# Patient Record
Sex: MALE | Race: WHITE | NOT HISPANIC OR LATINO
[De-identification: names, ages, dates, MRNs, and addresses within clinical notes are randomized per-mention and may not be internally consistent; named-entity substitution may affect disease eponyms.]

---

## 2022-06-06 ENCOUNTER — APPOINTMENT (OUTPATIENT)
Dept: UROLOGY | Facility: CLINIC | Age: 67
End: 2022-06-06
Payer: SELF-PAY

## 2022-06-06 VITALS
HEIGHT: 72 IN | SYSTOLIC BLOOD PRESSURE: 118 MMHG | BODY MASS INDEX: 21.67 KG/M2 | DIASTOLIC BLOOD PRESSURE: 78 MMHG | WEIGHT: 160 LBS | TEMPERATURE: 97.2 F

## 2022-06-06 DIAGNOSIS — E03.9 HYPOTHYROIDISM, UNSPECIFIED: ICD-10-CM

## 2022-06-06 DIAGNOSIS — Z78.9 OTHER SPECIFIED HEALTH STATUS: ICD-10-CM

## 2022-06-06 PROCEDURE — 99205 OFFICE O/P NEW HI 60 MIN: CPT

## 2022-06-06 RX ORDER — TESTOSTERONE 30 MG/1.5ML
SOLUTION TOPICAL
Refills: 0 | Status: ACTIVE | COMMUNITY

## 2022-06-06 RX ORDER — MODAFINIL 200 MG/1
200 TABLET ORAL
Refills: 0 | Status: ACTIVE | COMMUNITY

## 2022-06-06 RX ORDER — LEVOTHYROXINE SODIUM ANHYDROUS 100 UG/5ML
INJECTION, POWDER, LYOPHILIZED, FOR SOLUTION INTRAVENOUS
Refills: 0 | Status: ACTIVE | COMMUNITY

## 2022-06-06 NOTE — ASSESSMENT
[FreeTextEntry1] : The patient came in to discuss the different treatment options available for his organic erectile dysfunction. The following note describes the conversation that was performed today during the consultation. \par \par  I reviewed the Patient History Form which the patient filled out, made sure that his ailment was organic erectile dysfunction and I discussed in detail with him all previously tried treatments for his ED. We had a thorough discussion about all of the alternatives available, and I made sure to include in our discussion pills such as Levitra/Viagra/Cialis, as well as penile self injectable therapies, MUSE (Medicated Urethral System for Erection), vacuum device, and penile implant.  I stressed the risks and benefits and pros and cons of each of these options extensively. A power point presentation was also used to illustrate each treatment option. The patient was also provided with a packet of written information for him to take to his home that included the following documents and reading materials: \par \par -     Brief practice description\par \par -     An erectile dysfunction expert speaks up \par \par -     Implant for him\par \par -     Inflatable penile implants cure erectile dysfunction\par \par -     Erectile dysfunction\par \par -     New treatments for erectile dysfunction\par \par -     [employer]\par \par -     Duplex Sonography description\par \par -     Penile prosthesis patient phone list\par \par -     "No-Touch Technique" article\par \par -     Coated implants and "No Touch" surgical technique decreases risk of infection in inflatable penile prosthesis implantation to 0.46% article\par \par -     Internal penile pump brochure\par \par -     Sex does the body good article\par \par -     Erectile dysfunction: The bare basics\par \par -     Consortium for improvement in erectile dysfunction\par \par -     What is new for inflatable penile prostheses?\par \par -     Cover story: Multi-Component inflatable penile prostheses: Keys to success\par \par In discussing penile implant surgery, the patient was made aware of the different types of penile implants- including semirigid devices, 2-piece or Ambicor (AMS) devices, and the inflatable penile prosthesis with 3 components. I went on to mention that there are 2 brands of devices, Coloplast and AMS, and that the AMS is impregnated with antibiotic (inhibizone), and the Coloplast is dipped then coated with an antibiotic. I also referred him to my website in order to obtain additional information about the types of implants available.  He felt he would defer to my judgment as to which device to use.\par \par In the power point presentation, the patient was also able to see pictures of implanted patients, highlights of the "No-Touch" surgical technique and outcome data including number of procedures previously performed and updated rate of infection. In this initial discussion of the penile implant option, I made sure we had a very long and kush discussion about the risks.  I stated that, first and foremost, infection is the most dreaded risk and complication, which range in incidence from 1-3% of all cases performed in the [place], but that in my hands, using the "No-Touch" technique the incidence of infection is less than 1%.  I stated that should infection occur, the entire device would need to be removed, which typically happens in the first several weeks after surgery.  I explained that should this occur, there would likely be corporal fibrosis, scarring, penile shortening and even penile necrosis and disfigurement.  I said that while I would do absolutely everything possible to reduce and mitigate this risk, if it occur, the device would have to be removed, and then a salvage procedure with a semi-rigid implant possibly done, or the device would have to be removed with delayed re-implantation, or simply avoid future surgery completely.  I explained what this salvage procedure is, and that a new implant could be placed in the same setting with a complex irrigation of antibiotics and saline lavage, but that the infection risk at this salvage procedure is even higher, up to 30%. Furthermore the possible need for hospitalization, prolonged intravenous antibiotics and need further additional surgery was also discussed.  The patient was informed that if the salvage operation failed or if the infected implant were to be removed completely that significant shortening of the penis would occur making implantation of another device very difficult with very poor outcome and patient satisfaction. I explained that this is a real and significant risk that has to be weighed and considered.\par \par Next I expounded on the other risks of the operation.  These include injury to the urethra or bladder, and should these occur, the operation would have to be altered or aborted.  I explained that very rarely, vascular injury and bleeding can happen, and if iliac vein injury occurs from reservoir placement, this could be catastrophic and result in major blood loss and theoretically risk of leg loss in severe instances.  \par \par I went on to discuss that after the implant is placed, penile shortening could likely occur, and this is up to 1-2cm total.  Some of this is due to lack of glans engorgement, though MUSE could be used post-operatively to reduce this factor.  Next I also explained the risk of dissatisfaction with the cosmetic or functional result of the device, meaning that he could simply be unhappy with the result.  Some people find that while they have a good full erection, they have changes in sensation, difficulty obtaining an orgasm, and dissatisfaction with sex in general.  I made sure he verbalized and demonstrated a good understanding of these points.\par \par Next, I explained the risk of device breakage or failure, and future operations might be needed should this occur to fix the device tubing breakages with fluid leaks.  There is also a risk of auto-inflation, and even inability to successfully use the device due to technical considerations and inability to use or find the pump.  I did state that I would be available to him to teach him and train him to use his device, and also available to treat any other issue mentioned above such as device breakage or auto-inflation.\par \par Next we discussed the fact that rarely further minor surgery may be needed to make final adjustments to the penile implant. Reasons for this would be to adjust the length of the cylinders, reposition the pump or location of the reservoir. \par \par Prior to scheduling surgery the patient was asked to read the material which was provided to him today, to see a cardiologist to obtain a medical clearance, to visit my website [email or web address]   to obtain additional information, to call patients who were previously implanted and to discuss his options with his partner. Before leaving the office, I made sure he verbalized understanding all the risk and benefits, and pros and cons of surgery.  He had the ability to ask questions, and I also explained to him what to expect from the surgery.  I made sure he had access to literature to read, and offered him the ability to speak to prior patients of mine to get a sense of what to expect, and that these reports would hopefully be as unbiased as possible. If he remains interested in having an implant he understands that he will need to schedule a penile Duplex ultrasound study during which measurements of the penis will be made.\par \par  \par \par \par

## 2022-06-06 NOTE — HISTORY OF PRESENT ILLNESS
[FreeTextEntry1] : Patient is a 66-year-old gentleman with a 10-year history of erectile dysfunction the patient was initially treated with oral medications that are currently no longer effective.  He also did penile injection therapy and the vacuum pump.  He is  for 28 years his relationship is excellent however sexual relationship is currently in existent.  He was diagnosed with low testosterone level and is currently on testosterone injections.

## 2022-06-22 ENCOUNTER — APPOINTMENT (OUTPATIENT)
Dept: UROLOGY | Facility: CLINIC | Age: 67
End: 2022-06-22

## 2022-06-22 VITALS
BODY MASS INDEX: 21.67 KG/M2 | TEMPERATURE: 97.8 F | DIASTOLIC BLOOD PRESSURE: 80 MMHG | HEIGHT: 72 IN | WEIGHT: 160 LBS | SYSTOLIC BLOOD PRESSURE: 118 MMHG

## 2022-06-22 PROCEDURE — 54235 NJX CORPORA CAVERNOSA RX AGT: CPT

## 2022-06-22 PROCEDURE — 93980 PENILE VASCULAR STUDY: CPT

## 2022-06-22 PROCEDURE — 99214 OFFICE O/P EST MOD 30 MIN: CPT | Mod: 25

## 2022-06-22 NOTE — HISTORY OF PRESENT ILLNESS
[FreeTextEntry1] : The patient is here for duplex study.  He also wanted to discuss issues with penile implant surgery.  He is undergoing a prostate artery embolization for treatment of BPH and has also questions regarding the interaction with with that procedure and the penile implant procedure.

## 2022-06-22 NOTE — ASSESSMENT
[FreeTextEntry1] : PENILE DUPLEX SONOGRAPHY WITH PULSED DOPPLER ANALYSIS:\par Procedure description:\par The flaccid penis was scanned with the 18 MHz probe and images obtained longitudinally.  \par The diameters of the corporal arteries were measured:\par \par The right cavernosal artery measured:  mm\par The left cavernosal artery measured:    mm\par \par Following intracavernous injection of alprostadil 20 microgram/ml in 1  ml\par \par The penis was rescanned and the diameter of the cavernosal arteries were measured again to assess distensibility in response to the vasoactive medication. \par (A 100% increase in diameter is normally expected.)\par \par The left cavernosal artery measured:   mm\par The right cavernosal artery measured:   mm\par \par Pulsed Doppler analysis:\par Each of the selective imaged arteries underwent penile Doppler analysis with generation of waveform. \par The peak velocity data of the cavernosal arteries is tabulated below: \par (A velocity of 35 cm/s or more is normally expected.)  \par \par Left cavernosal artery peak systolic velocity at 15 minutes:   centimeters per second\par Let cavernosal end-diastolic velocity at 15 minutes:                centimeters per second\par \par Right cavernosal artery peak systolic velocity at 15 minutes:   centimeters per second \par Right end-diastolic velocity at 15 minutes:                                 centimeters per second\par \par Resistive index parameters were obtained bilaterally (normal is 100%): \par \par Left cavernosal artery resistive index:       % \par Right cavernosal artery resistive index:      % \par \par Spontaneous detumescence occurred after 60  minutes\par The patient was discharged with a soft erection and was advised to call should an erection persist for more than 4 hours.  \par \par Impression:\par Arterial  distensibility:   \par Arterial peak flow velocities:     \par Resistive index:  \par \par Based of the patient's medical history, pertinent physical findings and the above parameters, the patient's diagnosis is    \par \par After the Duplex study was terminated, I sat with the patient for 20 minutes and I explained to him the findings of the study. I also discussed his diagnosis with him as well as the recommended course of action. He understands the reason why he has ED and agrees with the plan of action.\par \par \par

## 2022-07-18 ENCOUNTER — APPOINTMENT (OUTPATIENT)
Dept: UROLOGY | Facility: CLINIC | Age: 67
End: 2022-07-18

## 2022-07-18 VITALS
HEIGHT: 72 IN | BODY MASS INDEX: 20.93 KG/M2 | WEIGHT: 154.5 LBS | DIASTOLIC BLOOD PRESSURE: 72 MMHG | TEMPERATURE: 98.4 F | SYSTOLIC BLOOD PRESSURE: 118 MMHG

## 2022-07-18 DIAGNOSIS — N48.6 INDURATION PENIS PLASTICA: ICD-10-CM

## 2022-07-18 DIAGNOSIS — Z01.818 ENCOUNTER FOR OTHER PREPROCEDURAL EXAMINATION: ICD-10-CM

## 2022-07-18 DIAGNOSIS — N48.89 OTHER SPECIFIED DISORDERS OF PENIS: ICD-10-CM

## 2022-07-18 PROCEDURE — 51741 ELECTRO-UROFLOWMETRY FIRST: CPT

## 2022-07-18 PROCEDURE — 52000 CYSTOURETHROSCOPY: CPT

## 2022-07-18 PROCEDURE — 51798 US URINE CAPACITY MEASURE: CPT | Mod: 59

## 2022-07-18 PROCEDURE — 99215 OFFICE O/P EST HI 40 MIN: CPT | Mod: 25

## 2022-07-18 PROCEDURE — 51725 SIMPLE CYSTOMETROGRAM: CPT

## 2022-07-18 RX ORDER — DOCUSATE SODIUM 100 MG/1
100 CAPSULE ORAL TWICE DAILY
Qty: 56 | Refills: 0 | Status: ACTIVE | COMMUNITY
Start: 2022-07-18 | End: 1900-01-01

## 2022-07-18 RX ORDER — IBUPROFEN 600 MG/1
600 TABLET, FILM COATED ORAL
Qty: 28 | Refills: 0 | Status: ACTIVE | COMMUNITY
Start: 2022-07-18 | End: 1900-01-01

## 2022-07-18 RX ORDER — CEPHALEXIN 250 MG/1
250 CAPSULE ORAL
Qty: 84 | Refills: 0 | Status: ACTIVE | COMMUNITY
Start: 2022-07-18 | End: 1900-01-01

## 2022-07-20 PROBLEM — N48.89 PENILE ATROPHY: Status: ACTIVE | Noted: 2022-06-06

## 2022-07-20 PROBLEM — N48.6 PEYRONIE'S DISEASE: Status: ACTIVE | Noted: 2022-06-06

## 2022-07-20 PROBLEM — Z01.818 PREOP TESTING: Status: ACTIVE | Noted: 2022-07-14

## 2022-07-20 NOTE — HISTORY OF PRESENT ILLNESS
[FreeTextEntry1] : The patient is here for his preoperative visit.  After the cystoscopy and cystometrogram the patient wanted to sit down and discuss the penile implant procedure.

## 2022-07-20 NOTE — ASSESSMENT
[FreeTextEntry1] : In this pre-operative setting, I spent some time (65 minutes) ensuring that the discussions we had in the office during the patient's initial consultation and penile Doppler visit was still clear in his mind, that he understood the risks and benefits and pros and cons of the penile implant procedure, including treatment alternatives, and that he verbalized the risks and wanted to proceed.  I again made sure he knew that the penile implant is a prosthesis that contains three basic elements consisting of: two parallel hollow cylinders that are placed within the penis, a pump and valve mechanism that is placed in the scrotum, and a spherical reservoir filled with saline that is placed in the lower abdomen.\par Although the penile prosthesis is placed through an incision in the scrotum, I explained that this is a surgical procedure that is relatively simple but can be more complicated in the presence of scar tissue in the pelvic area due to previous surgery and which will sometimes require an additional or alternative incision for placement of the reservoir, or potentially a narrow-based device if extensive fibrosis of the penis noted.  \par We reiterated that the penile implant is designed to simulate but not necessarily replace the natural erectile capability that he previously had.  Once implanted, there will be limited or no capability of natural erections occurring in the future and will limit the opportunity for other treatment options such as pills or injections, to successfully work in his body. When inflated, I explained and he understood that the implant will expand in girth, but not length and when deflated, the penis will become flaccid but potentially remain extended and will not retract due to the cylinders that are placed in the penile shaft.  The implant reservoir has a lock-out valve to limit the opportunity for fluid to flow into the cylinders and create an unexpected or unwanted erection, called "Auto Inflation".  Due to the lock-out valve, this rarely occurs, however if the reservoir is allowed to heal with the penile cylinders maintained partially inflated, he recognized that a partial erection will always be present and it is possible for auto-inflation to occur.\par We talked about the fact that the penile implant will not grow in length beyond the basic, "stretched penis" level and the measurement, which were obtained during the penile Duplex study.  This is the length he considers to be sufficient for sexual intercourse.  The glans, or tip, of the penis will not expand, as the tip of the cylinders is designed to stabilize and support the glans, but not inflate beyond the penile corona.  He was also counseled that although the results with an inflatable penile prosthesis are very good, the erection will never be as large as the previous normal erection.  The size of the flaccid penis may differ from that before the surgery.\par He understands that the risk of serious complications during surgery is very low.  There are a number of difficulties that can arise at the time of surgery, and are usually overcome.  Rarely the operation cannot be completed or an alternative to an inflatable penile prosthesis placed such as a semi-rigid implant.  The prosthesis is a mechanical device, which may suffer mechanical failure.  The current 10-year survival rate of the inflatable penile prosthesis is greater than 90%.  An operation can be performed to replace a device that has suffered mechanical failure.\par He has discussed the post-op care and expectations with Chelsie and me.  He realizes that it is not uncommon for patients to experience swelling, bruising, pain, irritation, etc for the first four-to-six weeks after the procedure.  \par Lastly, I made sure again that he understood that, like any surgical procedure, complications may occur.  These complications include but are not limited to infection (1-4%) and erosion (1-11%) (Erosion is when the prosthesis erodes through to the outside of the body).  An infected prosthesis cannot be saved by antibiotics alone and must then be removed.  It is possible to insert prosthesis at the same operative session (salvage) or at a later date but this is more difficult and the infection rate is higher. If another prosthesis cannot be inserted at the same time as the infected one removed, significant and irreversible penile shortening will occur.\par             I asked him if he had any further questions and made sure that he understood all of all the pre-operative instructions which were reviewed with him by Chelsie my . He was provided with written pre and postoperative instructions, prescriptions and my private cell number. I informed him that he may call for any questions or concerns at any time.\par \par \par MYRON SILVERMAN \par Dec 18 1955 \par 07/18/2022 \par Procedure: Cystoscopy Location: [facility] at [address] [place] [address],\[place] [address] Surgeon: J. Paz Adniel, M.D. Preop Diagnosis: Pre-op, BPH,obstruction/Urinary retention Postop Diagnosis:\par Same Anesthesia: 2% Intraurethral Lidocaine Jelly Medication: Levaquin Complication: None Operative Note: Discussed with the patient, risks and benefits of cystourethroscopy which includes hematuria, dysuria, stricture formation. The patient agrees to proceed with the above procedure: The patient was placed in the dorsal lithotomy position, prepped and draped in the usual standard sterile fashion with surgical bethadine soap and wash. 2% lidocaine jelly was inserted intraurethrally via the meatus. A clamp was applied to the penis and lidocaine jelly was allowed to remain in place for 5 minutes. The meatus was then intubated with a 16F flexible cystoscope. Visual cystourethroscopic examination was initiated under sterile water irrigation. The following findings were noted: The anterior urethra was normal. The Bulbar urethra was also normal. The membranous urethra was normal without any strictures. The prostatic urethra, lateral lobe and verumontanum appeared consistent with BPH. The prostatic urethra was obstructed with bilobar hypertrophy and an elevated bladder neck. Ureteral orifices were identified and clear efflux of urine was observed bilaterally. The bladder was examined in its entirety in a systematic fashion. Trabeculation observed: Moderate No mucosal abnormalities, stone, tumors, foreign bodies or diverticula identified. On retroflex, there was no significant tissue protruding into the bladder from the prostate. The patient tolerated the procedure well. He was discharged to home with PO antibiotics in stable condition.\par \par \par CYSTOMETROGRAM:\par \par Preop Diagnosis: Increased Frequency of urination. \par Postop Diagnosis: Increased Frequency of urination.\par Anesthesia: 2 % Intraurethral Lidocaine Jelly \par Medication: Ciprofloxacin\par Complications: None \par Operative Note: \par - The patient was placed in the dorsal lithotomy position and draped in the usual standard sterile fashion with surgical Betadine soap and wash. 2 % lidocaine jelly was inserted intraurethrally via the meatus. A clamp was applied to the penis and lidocaine jelly was allowed to remain in place for 5 minutes. \par \par The meatus was then intubated with a 16F flexible cystocope. Visual cystourethroscopic examination was initiated under sterile water irrigation. The findings observed are described in the cryoscopy report. \par \par With the flexible cystocope indwelling into the bladder, a sterile line of intravenous saline was connected into a manometer. This allowed us to measure the amount of fluid, pressure, and flow of the lower urinary tract. The following findings of the cystometrogram were noted. \par \par Bladder wall compliance: Normal\par Functional bladder capacity:    \par The patient perceived a first sensation that the bladder was filling with saline at:     cc\par His first urge to void was observed at    cc of saline. \par His post void residual was measured at     cc\par Bladder Scan and his flow rate peaked at 48 cc/min \par His proprioception was normal: normal \par Detrusor instability was: Not present \par Summary: \par The patient tolerated the procedure well. He was discharged to home on oral antibiotics to call if he develop difficulty with urination, bleeding, fever or chills.\par \par \par BLADDER SCAN:\par Indication: Increased frequency of urination. \par Initial Volume:    cc\par PVR:   cc\par Results: Stress urinary incontinence. \par Recommendations: No Therapy Needed.\par \par URO FLOWMETRY:\par Indication: Increased frequency of urination. \par Urinary Flow Rate:    m/s\par Results: Stress urinary incontinence    \par Recommendations: No therapy needed.\par \par \par

## 2022-07-21 ENCOUNTER — LABORATORY RESULT (OUTPATIENT)
Age: 67
End: 2022-07-21

## 2022-07-21 RX ORDER — SODIUM CHLORIDE 9 MG/ML
1000 INJECTION, SOLUTION INTRAVENOUS
Refills: 0 | Status: DISCONTINUED | OUTPATIENT
Start: 2022-07-21 | End: 2022-07-22

## 2022-07-21 NOTE — ASU PATIENT PROFILE, ADULT - NSICDXPASTSURGICALHX_GEN_ALL_CORE_FT
PAST SURGICAL HISTORY:  Elective surgery Prostatic Arterial Embolization    History of right hip replacement     Status post tonsillectomy     Friant teeth extracted      oral

## 2022-07-21 NOTE — ASU PATIENT PROFILE, ADULT - NS PRO AD INFO GIVEN Y
Asthma    CVA (cerebral vascular accident)    DM type 2 (diabetes mellitus, type 2)    History of MI (myocardial infarction)    HTN (hypertension)    Mitral valve prolapse    Obesity    Pulmonary emboli    Seizure disorder
yes

## 2022-07-21 NOTE — ASU PATIENT PROFILE, ADULT - NSICDXPASTMEDICALHX_GEN_ALL_CORE_FT
PAST MEDICAL HISTORY:  Acid reflux     GERD (gastroesophageal reflux disease)     Hiatal hernia     Hypercholesteremia     Hypothyroid

## 2022-07-22 ENCOUNTER — TRANSCRIPTION ENCOUNTER (OUTPATIENT)
Age: 67
End: 2022-07-22

## 2022-07-22 ENCOUNTER — OUTPATIENT (OUTPATIENT)
Dept: OUTPATIENT SERVICES | Facility: HOSPITAL | Age: 67
LOS: 1 days | Discharge: HOME | End: 2022-07-22

## 2022-07-22 ENCOUNTER — APPOINTMENT (OUTPATIENT)
Dept: UROLOGY | Facility: AMBULATORY SURGERY CENTER | Age: 67
End: 2022-07-22

## 2022-07-22 VITALS
HEART RATE: 52 BPM | TEMPERATURE: 97 F | WEIGHT: 156.09 LBS | RESPIRATION RATE: 15 BRPM | DIASTOLIC BLOOD PRESSURE: 63 MMHG | OXYGEN SATURATION: 99 % | HEIGHT: 72 IN | SYSTOLIC BLOOD PRESSURE: 109 MMHG

## 2022-07-22 VITALS
RESPIRATION RATE: 16 BRPM | TEMPERATURE: 99 F | DIASTOLIC BLOOD PRESSURE: 69 MMHG | SYSTOLIC BLOOD PRESSURE: 121 MMHG | HEART RATE: 56 BPM

## 2022-07-22 DIAGNOSIS — K08.409 PARTIAL LOSS OF TEETH, UNSPECIFIED CAUSE, UNSPECIFIED CLASS: Chronic | ICD-10-CM

## 2022-07-22 DIAGNOSIS — Z90.89 ACQUIRED ABSENCE OF OTHER ORGANS: Chronic | ICD-10-CM

## 2022-07-22 DIAGNOSIS — Z96.641 PRESENCE OF RIGHT ARTIFICIAL HIP JOINT: Chronic | ICD-10-CM

## 2022-07-22 DIAGNOSIS — Z41.9 ENCOUNTER FOR PROCEDURE FOR PURPOSES OTHER THAN REMEDYING HEALTH STATE, UNSPECIFIED: Chronic | ICD-10-CM

## 2022-07-22 PROCEDURE — 54405 INSERT MULTI-COMP PENIS PROS: CPT | Mod: GC

## 2022-07-22 PROCEDURE — 54360 PENIS PLASTIC SURGERY: CPT | Mod: GC

## 2022-07-22 DEVICE — SURGIFLO HEMOSTATIC MATRIX KIT: Type: IMPLANTABLE DEVICE | Status: FUNCTIONAL

## 2022-07-22 DEVICE — KIT PENILE TITAN ASSEMBLY STANDARD: Type: IMPLANTABLE DEVICE | Status: FUNCTIONAL

## 2022-07-22 DEVICE — RESERVIOR TITAN CLOVERLEAF 125CC: Type: IMPLANTABLE DEVICE | Status: FUNCTIONAL

## 2022-07-22 DEVICE — PUMP ASSY TITAN 1 TOUCH RELEASE: Type: IMPLANTABLE DEVICE | Status: FUNCTIONAL

## 2022-07-22 DEVICE — IMP PENILE TITAN CYL SET 0 DEG 24CM: Type: IMPLANTABLE DEVICE | Status: FUNCTIONAL

## 2022-07-22 RX ORDER — LAMOTRIGINE 25 MG/1
1 TABLET, ORALLY DISINTEGRATING ORAL
Qty: 0 | Refills: 0 | DISCHARGE

## 2022-07-22 RX ORDER — FENTANYL CITRATE 50 UG/ML
25 INJECTION INTRAVENOUS
Refills: 0 | Status: DISCONTINUED | OUTPATIENT
Start: 2022-07-22 | End: 2022-07-22

## 2022-07-22 RX ORDER — CHLORHEXIDINE GLUCONATE 213 G/1000ML
1 SOLUTION TOPICAL ONCE
Refills: 0 | Status: COMPLETED | OUTPATIENT
Start: 2022-07-22 | End: 2022-07-22

## 2022-07-22 RX ORDER — VANCOMYCIN HCL 1 G
1250 VIAL (EA) INTRAVENOUS ONCE
Refills: 0 | Status: COMPLETED | OUTPATIENT
Start: 2022-07-22 | End: 2022-07-22

## 2022-07-22 RX ORDER — LEVOTHYROXINE SODIUM 125 MCG
12.5 TABLET ORAL
Qty: 0 | Refills: 0 | DISCHARGE

## 2022-07-22 RX ORDER — MODAFINIL 200 MG/1
200 TABLET ORAL
Qty: 0 | Refills: 0 | DISCHARGE

## 2022-07-22 RX ORDER — ONDANSETRON 8 MG/1
4 TABLET, FILM COATED ORAL ONCE
Refills: 0 | Status: DISCONTINUED | OUTPATIENT
Start: 2022-07-22 | End: 2022-07-22

## 2022-07-22 RX ORDER — GENTAMICIN SULFATE 40 MG/ML
240 VIAL (ML) INJECTION ONCE
Refills: 0 | Status: COMPLETED | OUTPATIENT
Start: 2022-07-22 | End: 2022-07-22

## 2022-07-22 RX ORDER — BUPROPION HYDROCHLORIDE 150 MG/1
300 TABLET, EXTENDED RELEASE ORAL
Qty: 0 | Refills: 0 | DISCHARGE

## 2022-07-22 RX ORDER — ACETAMINOPHEN 500 MG
1000 TABLET ORAL ONCE
Refills: 0 | Status: COMPLETED | OUTPATIENT
Start: 2022-07-22 | End: 2022-07-22

## 2022-07-22 RX ORDER — ATORVASTATIN CALCIUM 80 MG/1
1 TABLET, FILM COATED ORAL
Qty: 0 | Refills: 0 | DISCHARGE

## 2022-07-22 RX ORDER — SODIUM CHLORIDE 9 MG/ML
1000 INJECTION, SOLUTION INTRAVENOUS
Refills: 0 | Status: DISCONTINUED | OUTPATIENT
Start: 2022-07-22 | End: 2022-07-22

## 2022-07-22 RX ADMIN — SODIUM CHLORIDE 200 MILLILITER(S): 9 INJECTION, SOLUTION INTRAVENOUS at 08:27

## 2022-07-22 RX ADMIN — Medication 100 MILLIGRAM(S): at 10:01

## 2022-07-22 RX ADMIN — SODIUM CHLORIDE 200 MILLILITER(S): 9 INJECTION, SOLUTION INTRAVENOUS at 14:14

## 2022-07-22 RX ADMIN — Medication 1000 MILLIGRAM(S): at 17:05

## 2022-07-22 RX ADMIN — Medication 400 MILLIGRAM(S): at 16:46

## 2022-07-22 RX ADMIN — CHLORHEXIDINE GLUCONATE 1 APPLICATION(S): 213 SOLUTION TOPICAL at 08:27

## 2022-07-22 RX ADMIN — Medication 166.67 MILLIGRAM(S): at 08:27

## 2022-07-22 NOTE — ASU DISCHARGE PLAN (ADULT/PEDIATRIC) - CARE PROVIDER_API CALL
Rachel Sanchez)  Urology  [address] [place] [address]  Phone: [phone]  Fax: [phone]  Follow Up Time:

## 2022-07-22 NOTE — ASU DISCHARGE PLAN (ADULT/PEDIATRIC) - ASU DC SPECIAL INSTRUCTIONSFT
Bed rest for 3 days. Please remove jones catheter on Monday and take a warm bath afterwards. Scrotal support for 2 weeks.

## 2022-07-22 NOTE — ASU DISCHARGE PLAN (ADULT/PEDIATRIC) - NS MD DC FALL RISK RISK
For information on Fall & Injury Prevention, visit: [email or web address] OR  [email or web address] OR  https://www.cdc.gov/steadi/patient.html

## 2022-07-27 PROBLEM — K21.9 GASTRO-ESOPHAGEAL REFLUX DISEASE WITHOUT ESOPHAGITIS: Chronic | Status: ACTIVE | Noted: 2022-07-21

## 2022-07-27 PROBLEM — K44.9 DIAPHRAGMATIC HERNIA WITHOUT OBSTRUCTION OR GANGRENE: Chronic | Status: ACTIVE | Noted: 2022-07-21

## 2022-07-27 PROBLEM — E78.00 PURE HYPERCHOLESTEROLEMIA, UNSPECIFIED: Chronic | Status: ACTIVE | Noted: 2022-07-21

## 2022-07-27 PROBLEM — E03.9 HYPOTHYROIDISM, UNSPECIFIED: Chronic | Status: ACTIVE | Noted: 2022-07-21

## 2022-08-04 ENCOUNTER — APPOINTMENT (OUTPATIENT)
Dept: UROLOGY | Facility: CLINIC | Age: 67
End: 2022-08-04

## 2022-08-04 VITALS
TEMPERATURE: 97.9 F | WEIGHT: 154 LBS | BODY MASS INDEX: 20.86 KG/M2 | HEIGHT: 72 IN | DIASTOLIC BLOOD PRESSURE: 78 MMHG | SYSTOLIC BLOOD PRESSURE: 106 MMHG

## 2022-08-04 DIAGNOSIS — Z48.816 ENCOUNTER FOR SURGICAL AFTERCARE FOLLOWING SURGERY ON THE GENITOURINARY SYSTEM: ICD-10-CM

## 2022-08-04 DIAGNOSIS — N52.02 CORPORO-VENOUS OCCLUSIVE ERECTILE DYSFUNCTION: ICD-10-CM

## 2022-08-04 PROCEDURE — 99024 POSTOP FOLLOW-UP VISIT: CPT

## 2022-08-08 PROBLEM — N52.02 CORPORO-VENOUS OCCLUSIVE ERECTILE DYSFUNCTION: Status: ACTIVE | Noted: 2022-06-22

## 2022-08-08 PROBLEM — Z48.816 AFTERCARE FOLLOWING SURGERY OF THE GENITOURINARY SYSTEM: Status: ACTIVE | Noted: 2022-08-08

## 2022-08-08 NOTE — HISTORY OF PRESENT ILLNESS
[FreeTextEntry1] : The patient is here for his 2week postop penile implant insertion appointment.  He is doing very well.  He is able to inflate and deflate the device.

## 2022-08-08 NOTE — ASSESSMENT
[FreeTextEntry1] : The patient is 13 days following insertion of of inflatable penile implant.  He is doing well.  He was provided today with appropriate postop instructions.  A 2 weeks postop assessment was discussed with the patient and he was provided with further information on how to inflate and deflate his device.  He was also provided with a brochure of the appropriate manufacture as well as a model of the scrotal pump.\par \par SUTURE REMOVAL NOTE:\par Incision: Healing well, edges well approximated, no redness, swelling or drainage present. \par Drainage: No drainage present\par Number of sutures removed: 6\par Site appearance post procedure: clean intact\par Site care post procedure: site cleansed with alcohol swap with steri strips applied with edges approximated. \par Patient respond to procedure tolerated well without complication. \par Recommendation/ patient instructions: Follow up appointment. \par \par \par \par \par \par \par \par BLADDER SCAN:\par Indication: Increased frequency of urination. \par Initial Volume:    cc\par PVR:  137 cc\par Results: Stress urinary incontinence. \par Recommendations: No Therapy Needed.\par

## 2022-10-20 ENCOUNTER — APPOINTMENT (OUTPATIENT)
Dept: UROLOGY | Facility: CLINIC | Age: 67
End: 2022-10-20

## 2022-10-20 VITALS
WEIGHT: 160 LBS | DIASTOLIC BLOOD PRESSURE: 80 MMHG | BODY MASS INDEX: 21.67 KG/M2 | SYSTOLIC BLOOD PRESSURE: 118 MMHG | HEIGHT: 72 IN | TEMPERATURE: 97.3 F

## 2022-10-20 DIAGNOSIS — Z00.00 ENCOUNTER FOR GENERAL ADULT MEDICAL EXAMINATION W/OUT ABNORMAL FINDINGS: ICD-10-CM

## 2022-10-20 DIAGNOSIS — Z96.0 PRESENCE OF UROGENITAL IMPLANTS: ICD-10-CM

## 2022-10-20 DIAGNOSIS — N52.9 MALE ERECTILE DYSFUNCTION, UNSPECIFIED: ICD-10-CM

## 2022-10-20 DIAGNOSIS — R35.0 FREQUENCY OF MICTURITION: ICD-10-CM

## 2022-10-20 DIAGNOSIS — R39.12 POOR URINARY STREAM: ICD-10-CM

## 2022-10-20 DIAGNOSIS — N52.03 COMBINED ARTERIAL INSUFFICIENCY AND CORPORO-VENOUS OCCLUSIVE ERECTILE DYSFUNCTION: ICD-10-CM

## 2022-10-20 PROCEDURE — 51798 US URINE CAPACITY MEASURE: CPT

## 2022-10-20 PROCEDURE — 99213 OFFICE O/P EST LOW 20 MIN: CPT | Mod: 25

## 2022-10-26 PROBLEM — N52.9 ERECTILE DYSFUNCTION: Status: ACTIVE | Noted: 2022-06-06

## 2022-10-26 PROBLEM — Z00.00 ENCOUNTER FOR PREVENTIVE HEALTH EXAMINATION: Status: ACTIVE | Noted: 2022-05-17

## 2022-10-26 PROBLEM — R35.0 FREQUENCY OF URINATION: Status: ACTIVE | Noted: 2022-07-20

## 2022-10-26 PROBLEM — N52.03 COMBINED ARTERIAL INSUFFICIENCY AND CORPORO-VENOUS OCCLUSIVE ERECTILE DYSFUNCTION: Status: ACTIVE | Noted: 2022-06-06

## 2022-10-26 PROBLEM — R39.12 WEAK URINARY STREAM: Status: ACTIVE | Noted: 2022-06-06

## 2022-10-26 PROBLEM — Z96.0 S/P INSERTION OF PENILE IMPLANT: Status: ACTIVE | Noted: 2022-08-08

## 2022-10-26 NOTE — ASSESSMENT
[FreeTextEntry1] : BLADDER SCAN:\par Indication: Increased frequency of urination. \par Initial Volume:    cc\par PVR: 10  cc\par Results: Stress urinary incontinence. \par Recommendations: No Therapy Needed.\par

## 2022-10-26 NOTE — HISTORY OF PRESENT ILLNESS
[FreeTextEntry1] : The patient is 3 months status post insertion of the penile implant.  He is doing well he is able to inflate and deflate the device without difficulty.  He is able to engage in intercourse and reach orgasm.

## 2024-07-16 NOTE — ASU PREOP CHECKLIST - BP NONINVASIVE DIASTOLIC (MM HG)
Per lab scheduling was informed by patient that urine culture needed to be done prior to surgery, no order has been placed, unsure if patient will need additional labs done, please call patient once orders are placed. Thank you.     63

## (undated) DEVICE — BAG SPONGE COUNTER EZ

## (undated) DEVICE — PREP SCRUB BRUSH W CHG 4%

## (undated) DEVICE — PACK PROST PENILE LNX SURGICOUNT

## (undated) DEVICE — DRAIN URINARY LEG BAG WITH FLIP-FLO VALVE 32OZ

## (undated) DEVICE — MARKING PEN DEVON DUAL TIP W RULER

## (undated) DEVICE — DRSG TAPE UMBILICAL COTTON 2" X 30 X 1/8"

## (undated) DEVICE — SLV COMPRESSION KNEE MED

## (undated) DEVICE — SUPP ATHLETIC MALE XLG 44-55IN

## (undated) DEVICE — WARMING BLANKET UPPER ADULT

## (undated) DEVICE — SUT PROLENE 4-0 14" V-47

## (undated) DEVICE — PREP CHLORAPREP HI-LITE ORANGE 26ML

## (undated) DEVICE — SUT VICRYL 3-0 27" RB-1 UNDYED

## (undated) DEVICE — DRSG COMBINE 5X9"

## (undated) DEVICE — LONE STAR DILAMEZINSERT INSERTER BLUE 12MM

## (undated) DEVICE — LONE STAR ELASTIC STAYS 12MM BLUNT

## (undated) DEVICE — SUT PDS II 2-0 27" SH

## (undated) DEVICE — BAG DECANTER IV STERILE

## (undated) DEVICE — DRSG DERMABOND 0.7ML

## (undated) DEVICE — SUT PDS II 3-0 27" RB-1

## (undated) DEVICE — GLV 9 PROTEXIS (WHITE)